# Patient Record
Sex: MALE | Race: WHITE
[De-identification: names, ages, dates, MRNs, and addresses within clinical notes are randomized per-mention and may not be internally consistent; named-entity substitution may affect disease eponyms.]

---

## 2017-04-17 ENCOUNTER — HOSPITAL ENCOUNTER (EMERGENCY)
Dept: HOSPITAL 17 - PHEFT | Age: 22
Discharge: HOME | End: 2017-04-17
Payer: MEDICAID

## 2017-04-17 VITALS
SYSTOLIC BLOOD PRESSURE: 130 MMHG | DIASTOLIC BLOOD PRESSURE: 91 MMHG | HEART RATE: 71 BPM | OXYGEN SATURATION: 99 % | TEMPERATURE: 98.3 F | RESPIRATION RATE: 16 BRPM

## 2017-04-17 VITALS — BODY MASS INDEX: 24.07 KG/M2 | WEIGHT: 162.48 LBS | HEIGHT: 69 IN

## 2017-04-17 DIAGNOSIS — B35.3: Primary | ICD-10-CM

## 2017-04-17 PROCEDURE — 99282 EMERGENCY DEPT VISIT SF MDM: CPT

## 2017-04-17 NOTE — PD
HPI


Chief Complaint:  Skin Problem


Time Seen by Provider:  15:57


Travel History


International Travel<30 days:  No


Contact w/Intl Traveler<30days:  No


Traveled to known affect area:  No





History of Present Illness


HPI


21-year-old male with no significant past medical issues, presents to the ER 

today because he has a rash on his right foot which is refusing to go away.  He 

states that it has been itchy, and has been blistering at times, and comes and 

goes.  He states he has to wear closed toe shoes at work and he gets a lot of 

sweating in his shoes.  He thinks it may be athlete's foot which she has had in 

the past, but states that he could not get it to go away with foot powder and 

iodine washes.  He denies any other issues.





Modifying Factors: None


Associated Signs & Symptoms: Itchy rash on the foot for one month


Risk Factors: Previous history of athlete's foot in that foot





PFSH


Past Medical History


Medical History:  Denies Significant Hx


Diminished Hearing:  No


Immunizations Current:  Yes


Tetanus Vaccination:  > 5 Years


Influenza Vaccination:  No





Past Surgical History


Surgical History:  No Previous Surgery





Social History


Alcohol Use:  Yes (OCC)


Tobacco Use:  No (vapes)


Substance Use:  No





Allergies-Medications


(Allergen,Severity, Reaction):  


Coded Allergies:  


     No Known Allergies (Verified , 4/17/17)


Reported Meds & Prescriptions





Reported Meds & Active Scripts


Active








Review of Systems


Except as stated in HPI:  all other systems reviewed are Neg





Physical Exam


Narrative


GENERAL: This is a well-nourished, well-developed young male patient, in no 

apparent distress.


Right foot: There is notable scaling erythematous 2 cm area plan on the right 

first metatarsal area and a small 2-3 mean plaque on the right lateral foot 

area.  No significant underlying fluctuance, no blistering currently.  

Nontender to palpation.





Data


Data


Last Documented VS





Vital Signs








  Date Time  Temp Pulse Resp B/P Pulse Ox O2 Delivery O2 Flow Rate FiO2


 


4/17/17 15:33  70 20     


 


4/17/17 15:23 98.3   130/91 99   











MDM


Medical Decision Making


Medical Screen Exam Complete:  Yes


Emergency Medical Condition:  Yes


Medical Record Reviewed:  Yes


Differential Diagnosis


Foot rashallergic reaction versus fungal infection


Narrative Course


At this point, I suspect that is a fungal infection.  My plan would be to give 

him a antifungal cream, have him keep the foot dry, and follow-up with primary 

care physician.  Return as needed for new issues.  Patient states that he just 

wants a note for work.





Diagnosis





 Primary Impression:  


 Infection, fungal, right foot


***Med/Other Pt SpecificInfo:  Prescription(s) given


Scripts


Clotrimazole Topical (Lotrimin AF Topical)1% Cream1 Applic TOPICAL BID  #1 GM  

Ref 0


   Prov:Lobito Ceron MD         4/17/17


Disposition:  01 DISCHARGE HOME


Condition:  Stable








Lobito Ceron MD Apr 17, 2017 16:00

## 2017-06-11 ENCOUNTER — HOSPITAL ENCOUNTER (EMERGENCY)
Dept: HOSPITAL 17 - PHED | Age: 22
Discharge: HOME | End: 2017-06-11
Payer: MEDICAID

## 2017-06-11 VITALS
TEMPERATURE: 98 F | DIASTOLIC BLOOD PRESSURE: 80 MMHG | HEART RATE: 71 BPM | OXYGEN SATURATION: 99 % | SYSTOLIC BLOOD PRESSURE: 142 MMHG | RESPIRATION RATE: 16 BRPM

## 2017-06-11 VITALS — BODY MASS INDEX: 22.41 KG/M2 | HEIGHT: 70 IN | WEIGHT: 156.53 LBS

## 2017-06-11 DIAGNOSIS — S90.822A: ICD-10-CM
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
## 2017-06-11 DIAGNOSIS — S90.821A: Primary | ICD-10-CM
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
## 2017-06-11 PROCEDURE — 99281 EMR DPT VST MAYX REQ PHY/QHP: CPT

## 2017-06-11 NOTE — PD
HPI


Chief Complaint:  Skin Problem


Time Seen by Provider:  07:13


Travel History


International Travel<30 days:  No


Contact w/Intl Traveler<30days:  No


Traveled to known affect area:  No





History of Present Illness


HPI


This patient reports that he was driving barefoot in his car broke down.  He 

had to walk on some hot asphalt.  He got some blistering to the bottom of his 

feet.  Severity is mild.  Duration one day





PFSH


Past Medical History


Medical History:  Denies Significant Hx


Diminished Hearing:  No


Immunizations Current:  Yes


Tetanus Vaccination:  Unknown


Influenza Vaccination:  Yes





Past Surgical History


Surgical History:  No Previous Surgery





Social History


Alcohol Use:  Yes (occ)


Tobacco Use:  No


Substance Use:  No





Allergies-Medications


(Allergen,Severity, Reaction):  


Coded Allergies:  


     No Known Allergies (Verified , 6/11/17)


Reported Meds & Prescriptions





Reported Meds & Active Scripts


Active


No Active Prescriptions or Reported Medications    








Review of Systems


General / Constitutional:  No: Fever


HENT:  No: Headaches


Cardiovascular:  No: Chest Pain or Discomfort





Physical Exam


Narrative


Psych: Normal mood and affect.  Normal insight and judgment.





SKIN: Focused skin assessment reveals no rash or ulcers. Skin is warm and dry.  

Palpation shows no induration or nodules.





Feet: Minor blistering on the bottom of the feet.  No sign of infection.





Data


Data


Last Documented VS





Vital Signs








  Date Time  Temp Pulse Resp B/P Pulse Ox O2 Delivery O2 Flow Rate FiO2


 


6/11/17 06:59 98.0 71 16 142/80 99   











MDM


Medical Decision Making


Medical Screen Exam Complete:  Yes


Emergency Medical Condition:  Yes


Medical Record Reviewed:  Yes


Differential Diagnosis


Blister, burning, rash


Narrative Course


I have reviewed the patient's electronic medical record.





Supportive care discussed.  No specific medical treatment needed





Diagnosis





 Primary Impression:  


 Blister of foot without infection


 Qualified Code:  S90.829A - Blister of foot without infection, unspecified 

laterality, initial encounter





***Additional Instructions:


The patient was advised to follow up with their physician and return if they 

worsen.


***Med/Other Pt SpecificInfo:  Other


Scripts


No Active Prescriptions or Reported Meds


Disposition:  01 DISCHARGE HOME


Condition:  Stable








Pepe Mirza MD Jun 11, 2017 07:26

## 2018-01-09 ENCOUNTER — HOSPITAL ENCOUNTER (EMERGENCY)
Dept: HOSPITAL 17 - PHEFT | Age: 23
Discharge: HOME | End: 2018-01-09
Payer: MEDICAID

## 2018-01-09 VITALS
TEMPERATURE: 98.4 F | RESPIRATION RATE: 16 BRPM | HEART RATE: 71 BPM | OXYGEN SATURATION: 98 % | SYSTOLIC BLOOD PRESSURE: 151 MMHG | DIASTOLIC BLOOD PRESSURE: 69 MMHG

## 2018-01-09 VITALS — BODY MASS INDEX: 24.52 KG/M2 | WEIGHT: 165.57 LBS | HEIGHT: 69 IN

## 2018-01-09 DIAGNOSIS — R05: ICD-10-CM

## 2018-01-09 DIAGNOSIS — R50.9: ICD-10-CM

## 2018-01-09 DIAGNOSIS — M79.1: ICD-10-CM

## 2018-01-09 DIAGNOSIS — J01.10: Primary | ICD-10-CM

## 2018-01-09 PROCEDURE — 87804 INFLUENZA ASSAY W/OPTIC: CPT

## 2018-01-09 PROCEDURE — 99283 EMERGENCY DEPT VISIT LOW MDM: CPT

## 2018-01-09 NOTE — PD
HPI


Chief Complaint:  Cold / Flu Symptoms


Time Seen by Provider:  11:01


Travel History


International Travel<30 days:  No


Contact w/Intl Traveler<30days:  No


Traveled to known affect area:  No





History of Present Illness


HPI


Patient comes in complaining of cough, congestion, subjective fevers, and 

generalized body aches ongoing for 4 days.  Patient feels like he may have the 

flu.  Patient reports taking BC powder for this last dose was yesterday and 

seemed to help some.  Denies anything making it worse.  Denies any chest pain 

or shortness of breath, nausea, vomiting, neck pain, sore throat, loss or 

change in bowel or bladder, headache, or numbness or tingling anywhere.  

Patient reports sinus pressure over his frontal sinuses without radiation.





[facility]


Past Medical History


Medical History:  Denies Significant Hx


Diminished Hearing:  No


Immunizations Current:  Yes





Social History


Alcohol Use:  Yes (occ)


Tobacco Use:  No


Substance Use:  No





Allergies-Medications


(Allergen,Severity, Reaction):  


Coded Allergies:  


     No Known Allergies (Verified  Adverse Reaction, Unknown, 1/9/18)


Reported Meds & Prescriptions





Reported Meds & Active Scripts


Active


Zithromax Z-Marcin (Azithromycin) 250 Mg Dspk 250 Mg PO AS DIRECTED


     500 MG (2 tabs) day 1, then 1 tab days 2-5.








Review of Systems


Except as stated in HPI:  all other systems reviewed are Neg





Physical Exam


Narrative


GENERAL: Well-developed, well nourished, in no acute distress, and non-ill 

appearing.


SKIN: Focused skin assessment warm and dry.


HEAD: Atraumatic. Normocephalic. 


EYES: Pupils equal and round. EOMI. No scleral icterus. No injection or 

drainage. 


ENT: No nasal bleeding or discharge.  Mucous membranes pink and moist.  

Tympanic membranes pearly gray bilaterally.  Posterior pharynx nonerythematous 

without exudate.  uvula is midline.  Patient reports tenderness to palpation  

frontal sinuses.


NECK: Trachea midline. No cervical lymphadenopathy. Supple.  No nuclear 

rigidity.


CARDIOVASCULAR: Regular rate and rhythm.  No murmur appreciated.


RESPIRATORY: No accessory muscle use.  No respiratory distress. Clear to 

auscultation. Breath sounds equal bilaterally.  No coughing on exam.  Patient 

speaking in full sentences without difficulty.


MUSCULOSKELETAL: No obvious deformities. No clubbing.  No cyanosis.  No edema.  

Full range of motion.


NEUROLOGICAL: Awake and alert. No obvious cranial nerve deficits.  Motor 

grossly within normal limits. Normal speech.


PSYCHIATRIC: Appropriate mood and affect; insight and judgment normal.





Data


Data


Last Documented VS





Vital Signs








  Date Time  Temp Pulse Resp B/P (MAP) Pulse Ox O2 Delivery O2 Flow Rate FiO2


 


1/9/18 10:58 98.4 71 16 151/69 (96) 98   








Orders





 Orders


Influenzae A/B Antigen (1/9/18 11:04)


Ed Discharge Order (1/9/18 11:39)








[facility]


Medical Decision Making


Medical Screen Exam Complete:  Yes


Emergency Medical Condition:  Yes


Differential Diagnosis


Influenza, sinusitis, allergies, URI, rhonchi, viral syndrome


Narrative Course


Patient looks great, non-ill appearing. The patient is tolerating fluids and is 

well hydrated. Appears acute sinusitis. No clinical evidence by history or 

evaluation to suspect meningitis and/or sepsis. There was no evidence to 

suggest deep abscess or cavernous sinus involvement. I discussed with the 

patient, diagnosis, plan of care, medications and to follow up with the 

patients primary physician. The patient was instructed to return if the worsens 

in anyway, especially if not tolerating fluids, increased sinus pain or swelling

, worsening headache, persistent fever, difficulty swallowing or breathing, or 

as needed. The patient agreed with plan.





Patient in no obvious distress upon re-evaluation. All pertinent laboratory 

result(s) discussed with patient. Patient was asked if they wanted to speak to 

my attending, which the patient did not wish to do at this time.  Any questions/

concerns in reference to patient diagnosis/condition discussed and clarified 

prior to patient's discharge. Reinforced sheer importance of close follow up 

with patient's primary physician or primary care clinic. Instructed patient to 

return to ED immediately, if symptoms return/worsen. Patient showed 

understanding of above instructions.  Further instructions and recommendations 

were detailed in discharge paperwork.  Patient ambulated without difficulty out 

of ED at discharge.





Diagnosis





 Primary Impression:  


 Sinusitis, acute frontal


 Qualified Codes:  J01.10 - Acute frontal sinusitis, unspecified


Referrals:  


[facility]


Patient Instructions:  General Instructions, Sinusitis (GEN)


Departure Forms:  Work Release   Enter return to work date:  Charles 10, 2018





***Additional Instructions:  


Follow-up with your primary care physician in 3-5 days for reevaluation.  Take 

all medication as prescribed.  Drink plenty of non-caffeinated and nonalcoholic 

fluids.  Use over-the-counter cold and flu medication as needed for symptomatic 

relief.  Follow instructions and the packaging.  Return to the emergency 

department if symptoms get worse.


***Med/Other Pt SpecificInfo:  Prescription(s) given


Scripts


Azithromycin (Zithromax Z-Marcin) 250 Mg Dspk


250 MG PO AS DIRECTED for Infection, #1 DSPK 0 Refills


   500 MG (2 tabs) day 1, then 1 tab days 2-5.


   Prov: Lobito Ceron MD         1/9/18


Disposition:  01 DISCHARGE HOME


Condition:  Stable











Toñito Scott Jan 9, 2018 11:07

## 2018-05-26 ENCOUNTER — HOSPITAL ENCOUNTER (EMERGENCY)
Dept: HOSPITAL 17 - PHED | Age: 23
Discharge: HOME | End: 2018-05-26
Payer: SELF-PAY

## 2018-05-26 VITALS
HEART RATE: 71 BPM | TEMPERATURE: 97.8 F | SYSTOLIC BLOOD PRESSURE: 119 MMHG | OXYGEN SATURATION: 98 % | DIASTOLIC BLOOD PRESSURE: 65 MMHG

## 2018-05-26 VITALS — BODY MASS INDEX: 24.13 KG/M2 | HEIGHT: 69 IN | WEIGHT: 162.92 LBS

## 2018-05-26 DIAGNOSIS — J40: Primary | ICD-10-CM

## 2018-05-26 DIAGNOSIS — F17.290: ICD-10-CM

## 2018-05-26 PROCEDURE — 99283 EMERGENCY DEPT VISIT LOW MDM: CPT
